# Patient Record
Sex: MALE | Race: WHITE | URBAN - METROPOLITAN AREA
[De-identification: names, ages, dates, MRNs, and addresses within clinical notes are randomized per-mention and may not be internally consistent; named-entity substitution may affect disease eponyms.]

---

## 2017-04-14 ENCOUNTER — OFFICE VISIT (OUTPATIENT)
Dept: FAMILY MEDICINE CLINIC | Age: 62
End: 2017-04-14

## 2017-04-14 VITALS
BODY MASS INDEX: 31.64 KG/M2 | WEIGHT: 221 LBS | TEMPERATURE: 97.6 F | SYSTOLIC BLOOD PRESSURE: 131 MMHG | HEART RATE: 64 BPM | OXYGEN SATURATION: 98 % | RESPIRATION RATE: 16 BRPM | HEIGHT: 70 IN | DIASTOLIC BLOOD PRESSURE: 73 MMHG

## 2017-04-14 DIAGNOSIS — Z00.00 PHYSICAL EXAM, ANNUAL: Primary | ICD-10-CM

## 2017-04-14 DIAGNOSIS — Z11.59 NEED FOR HEPATITIS C SCREENING TEST: ICD-10-CM

## 2017-04-14 DIAGNOSIS — E78.5 HYPERLIPIDEMIA, UNSPECIFIED HYPERLIPIDEMIA TYPE: ICD-10-CM

## 2017-04-14 DIAGNOSIS — Z12.11 SCREENING FOR COLON CANCER: ICD-10-CM

## 2017-04-14 DIAGNOSIS — Z23 NEED FOR VARICELLA VACCINE: ICD-10-CM

## 2017-04-14 NOTE — PATIENT INSTRUCTIONS
Shingles Vaccine: What You Need to Know  What is shingles? Shingles is a painful skin rash, often with blisters. It is also called herpes zoster, or just zoster. A shingles rash usually appears on one side of the face or body and lasts from 2 to 4 weeks. Its main symptom is pain, which can be quite severe. Other symptoms of shingles can include fever, headache, chills and upset stomach. Very rarely, a shingles infection can lead to pneumonia, hearing problems, blindness, brain inflammation (encephalitis) or death. For about 1 person in 5, severe pain can continue even long after the rash clears up. This is called post-herpetic neuralgia. Shingles is caused by the varicella zoster virus, the same virus that causes chickenpox. Only someone who has had chickenpox--or, rarely, has gotten chickenpox vaccine--can get shingles. The virus stays in your body, and can cause shingles many years later. You can't catch shingles from another person with shingles. However, a person who has never had chickenpox (or chickenpox vaccine) could get chickenpox from someone with shingles. This is not very common. Shingles is far more common in people 48years of age and older than in younger people. It is also more common in people whose immune systems are weakened because of a disease such as cancer, or drugs such as steroids or chemotherapy. At least 1 million people a year in the [facility] get shingles. Shingles vaccine  A vaccine for shingles was licensed in 1105. In clinical trials, the vaccine reduced the risk of shingles by 50%. It can also reduce pain in people who still get shingles after being vaccinated. A single dose of shingles vaccine is recommended for adults 61years of age and older.   Some people should not get shingles vaccine or should wait  A person should not get shingles vaccine who:  · Has ever had a life-threatening allergic reaction to gelatin, the antibiotic neomycin, or any other component of shingles vaccine. Tell your doctor if you have any severe allergies. · Has a weakened immune system because of current:  ¨ AIDS or another disease that affects the immune system. ¨ Treatment with drugs that affect the immune system, such as prolonged use of high-dose steroids. ¨ Cancer treatment such as radiation or chemotherapy. ¨ Cancer affecting the bone marrow or lymphatic system, such as leukemia or lymphoma. · Is pregnant, or might be pregnant. Women should not become pregnant until at least 4 weeks after getting shingles vaccine. Someone with a minor acute illness, such as a cold, may be vaccinated. But anyone with a moderate or severe acute illness should usually wait until they recover before getting the vaccine. This includes anyone with a temperature of 101.3° F or higher. What are the risks from shingles vaccine? A vaccine, like any medicine, could possibly cause serious problems, such as severe allergic reactions. However, the risk of a vaccine causing serious harm, or death, is extremely small. No serious problems have been identified with shingles vaccine. Mild problems  · Redness, soreness, swelling, or itching at the site of the injection (about 1 person in 3)  · Headache (about 1 person in 70)  Like all vaccines, shingles vaccine is being closely monitored for unusual or severe problems. What if there is a serious reaction? What should I look for? · Look for anything that concerns you, such as signs of a severe allergic reaction, very high fever, or behavior changes. Signs of a severe allergic reaction can include hives, swelling of the face and throat, difficulty breathing, a fast heartbeat, dizziness, and weakness. These would start a few minutes to a few hours after the vaccination. What should I do? · If you think it is a severe allergic reaction or other emergency that can't wait, call 9-1-1 or get the person to the nearest hospital. Otherwise, call your doctor.   · Afterward, the reaction should be reported to the Vaccine Adverse Event Reporting System (VAERS). Your doctor might file this report, or you can do it yourself through the VAERS web site at www.vaers. hhs.gov, or by calling 3-689.723.9983. VAERS is only for reporting reactions. They do not give medical advice. How can I learn more? · Ask your doctor. · Call your local or state health department. · Contact the Centers for Disease Control and Prevention (CDC):  ¨ Call 8-828.102.9808 (1-800-CDC-INFO) or  ¨ Visit CDC's website at www.cdc.gov/vaccines  Vaccine Information Statement  Shingles Vaccine  (10/6/2009)  Department of Health and Human Services  Centers for Disease Control and Prevention  Many Vaccine Information Statements are available in [place] and other languages. See www.immunize.org/vis. Muchas hojas de información sobre vacunas están disponibles en español y en otros idiomas. Visite www.immunize.org/vis. Care instructions adapted under license by your healthcare professional. If you have questions about a medical condition or this instruction, always ask your healthcare professional. [address] any warranty or liability for your use of this information. Learning About Colonoscopy  What is a colonoscopy? A colonoscopy is a test (also called a procedure) that lets a doctor look inside your large intestine. The doctor uses a thin, lighted tube called a colonoscope. The doctor uses it to look for small growths called polyps, colon or rectal cancer (colorectal cancer), or other problems like bleeding. During the procedure, the doctor can take samples of tissue. The samples can then be checked for cancer or other conditions. The doctor can also take out polyps. How is colonoscopy done? This procedure is done in a doctor's office or a clinic or hospital. You will get medicine to help you relax and not feel pain.  Some people find that they do not remember having the test because of the medicine. The doctor gently moves the colonoscope, or scope, through the colon. The scope is also a small video camera. It lets the doctor see the colon and take pictures. A colonoscopy usually takes 30 to 45 minutes. It may take longer if the doctor has to remove polyps. How do you prepare for the procedure? You need to clean out your colon before the procedure so the doctor can see all of your colon. You may start the cleaning process a day or two before the test. This depends on which \"colon prep\" your doctor recommends. To clean your colon, you stop eating solid foods and drink only clear liquids. You can have water, tea, coffee, clear juices, clear broths, flavored ice pops, and gelatin (such as Jell-O). Do not drink anything red or purple, such as grape juice or fruit punch. And do not eat red or purple foods, such as grape ice pops or cherry gelatin. The day or night before the procedure, you drink a large amount of a special liquid. This causes loose, frequent stools. You will go to the bathroom a lot. It is very important to drink all of the colon prep liquid. If you have problems drinking the liquid, call your doctor. For many people, the prep is worse than the test. It may be uncomfortable, and you may feel hungry on the clear liquid diet. Some people do not go to work or do their usual activities on the day of the prep. Arrange to have someone take you home after the test.  What can you expect after a colonoscopy? The nurses will watch you for 1 to 2 hours until the medicines wear off. Then you can go home. You will need a ride. Your doctor will tell you when you can eat and do your usual activities. Your doctor will talk to you about when you will need your next colonoscopy. The results of your test and your risk for colorectal cancer will help your doctor decide how often you need to be checked. Follow-up care is a key part of your treatment and safety.  Be sure to make and go to all appointments, and call your doctor if you are having problems. It's also a good idea to know your test results and keep a list of the medicines you take. Where can you learn more? Go to http://jonas-yanet.info/. Enter G951 in the search box to learn more about \"Learning About Colonoscopy. \"  Current as of: July 26, 2016  Content Version: 11.2  © 6709-0275 [employer]. Care instructions adapted under license by [employer] (which disclaims liability or warranty for this information). If you have questions about a medical condition or this instruction, always ask your healthcare professional. [address] any warranty or liability for your use of this information.

## 2017-04-14 NOTE — MR AVS SNAPSHOT
Visit Information Date & Time Provider Department Dept. Phone Encounter #  
 4/14/2017  8:20 AM Christa Mehta, [facility] [phone] [id number] Upcoming Health Maintenance Date Due Hepatitis C Screening 1955 FOBT Q 1 YEAR AGE 50-75 7/13/2006 ZOSTER VACCINE AGE 60> 7/13/2016 INFLUENZA AGE 9 TO ADULT 8/1/2016 DTaP/Tdap/Td series (2 - Td) 3/1/2022 Allergies as of 4/14/2017  Review Complete On: 9/16/2015 By: Landry Brand NP No Known Allergies Current Immunizations  Reviewed on 8/13/2015 No immunizations on file. Not reviewed this visit You Were Diagnosed With   
  
 Codes Comments Physical exam, annual    -  Primary ICD-10-CM: Z00.00 ICD-9-CM: V70.0 Hyperlipidemia, unspecified hyperlipidemia type     ICD-10-CM: E78.5 ICD-9-CM: 272.4 Screening for colon cancer     ICD-10-CM: Z12.11 ICD-9-CM: V76.51 Need for varicella vaccine     ICD-10-CM: Z23 ICD-9-CM: V05.4 Vitals BP Pulse Temp Resp Height(growth percentile) Weight(growth percentile) 131/73 64 97.6 °F (36.4 °C) (Oral) 16 5' 10\" (1.778 m) 221 lb (100.2 kg) SpO2 BMI Smoking Status 98% 31.71 kg/m2 Former Smoker BMI and BSA Data Body Mass Index Body Surface Area 31.71 kg/m 2 2.22 m 2 Preferred Pharmacy Pharmacy Name Phone [facility] [place], [address] [place] [facility] [phone] Your Updated Medication List  
  
   
This list is accurate as of: 4/14/17  8:49 AM.  Always use your most recent med list.  
  
  
  
  
 aspirin 81 mg chewable tablet Take 1 Tab by mouth daily. FISH OIL 1,000 mg Cap Generic drug:  omega-3 fatty acids-vitamin e Take 1 Cap by mouth.  
  
 multivitamin, tx-iron-ca-min 9 mg iron-400 mcg Tab tablet Commonly known as:  THERA-M w/ IRON Take 1 Tab by mouth daily. simvastatin 40 mg tablet Commonly known as:  ZOCOR Take 1 Tab by mouth nightly. varicella virus vaccine (live) 1,350 unit/0.5 mL injection Commonly known as:  VARIVAX  
0.5 mL by SubCUTAneous route once for 1 dose. Prescriptions Printed Refills  
 varicella virus vaccine, live, (VARIVAX) 1,350 unit/0.5 mL injection 0 Si.5 mL by SubCUTAneous route once for 1 dose. Class: Print Route: SubCUTAneous We Performed the Following CBC W/O DIFF [52097 CPT(R)] LIPID PANEL [99033 CPT(R)] METABOLIC PANEL, COMPREHENSIVE [36212 CPT(R)] PSA W/ REFLX FREE PSA [70916 CPT(R)] REFERRAL FOR COLONOSCOPY [TCX444 Custom] Comments:  
 Please evaluate patient for screening colonoscopy. Kimberly Olson MD 
[facility], [address] [place] [address] Office: [phone] Daniele Sanchez MD 
[facility] [address] [address] Phone: [phone] Fax: [phone] Referral Information Referral ID Referred By Referred To  
  
 [id number] [facility] Not Available Visits Status Start Date End Date 1 New Request 17 If your referral has a status of pending review or denied, additional information will be sent to support the outcome of this decision. Patient Instructions Shingles Vaccine: What You Need to Know What is shingles? Shingles is a painful skin rash, often with blisters. It is also called herpes zoster, or just zoster. A shingles rash usually appears on one side of the face or body and lasts from 2 to 4 weeks. Its main symptom is pain, which can be quite severe. Other symptoms of shingles can include fever, headache, chills and upset stomach. Very rarely, a shingles infection can lead to pneumonia, hearing problems, blindness, brain inflammation (encephalitis) or death.  
For about 1 person in 5, severe pain can continue even long after the rash clears up. This is called post-herpetic neuralgia. Shingles is caused by the varicella zoster virus, the same virus that causes chickenpox. Only someone who has had chickenpoxor, rarely, has gotten chickenpox vaccinecan get shingles. The virus stays in your body, and can cause shingles many years later. You can't catch shingles from another person with shingles. However, a person who has never had chickenpox (or chickenpox vaccine) could get chickenpox from someone with shingles. This is not very common. Shingles is far more common in people 48years of age and older than in younger people. It is also more common in people whose immune systems are weakened because of a disease such as cancer, or drugs such as steroids or chemotherapy. At least 1 million people a year in the [facility] get shingles. Shingles vaccine A vaccine for shingles was licensed in 6280. In clinical trials, the vaccine reduced the risk of shingles by 50%. It can also reduce pain in people who still get shingles after being vaccinated. A single dose of shingles vaccine is recommended for adults 61years of age and older. Some people should not get shingles vaccine or should wait A person should not get shingles vaccine who: 
· Has ever had a life-threatening allergic reaction to gelatin, the antibiotic neomycin, or any other component of shingles vaccine. Tell your doctor if you have any severe allergies. · Has a weakened immune system because of current: 
¨ AIDS or another disease that affects the immune system. ¨ Treatment with drugs that affect the immune system, such as prolonged use of high-dose steroids. ¨ Cancer treatment such as radiation or chemotherapy. ¨ Cancer affecting the bone marrow or lymphatic system, such as leukemia or lymphoma. · Is pregnant, or might be pregnant. Women should not become pregnant until at least 4 weeks after getting shingles vaccine. Someone with a minor acute illness, such as a cold, may be vaccinated. But anyone with a moderate or severe acute illness should usually wait until they recover before getting the vaccine. This includes anyone with a temperature of 101.3° F or higher. What are the risks from shingles vaccine? A vaccine, like any medicine, could possibly cause serious problems, such as severe allergic reactions. However, the risk of a vaccine causing serious harm, or death, is extremely small. No serious problems have been identified with shingles vaccine. Mild problems · Redness, soreness, swelling, or itching at the site of the injection (about 1 person in 3) · Headache (about 1 person in 79) Like all vaccines, shingles vaccine is being closely monitored for unusual or severe problems. What if there is a serious reaction? What should I look for? · Look for anything that concerns you, such as signs of a severe allergic reaction, very high fever, or behavior changes. Signs of a severe allergic reaction can include hives, swelling of the face and throat, difficulty breathing, a fast heartbeat, dizziness, and weakness. These would start a few minutes to a few hours after the vaccination. What should I do? · If you think it is a severe allergic reaction or other emergency that can't wait, call 9-1-1 or get the person to the nearest hospital. Otherwise, call your doctor. · Afterward, the reaction should be reported to the Vaccine Adverse Event Reporting System (VAERS). Your doctor might file this report, or you can do it yourself through the VAERS web site at www.vaers. hhs.gov, or by calling 3-914.676.9533. VAERS is only for reporting reactions. They do not give medical advice. How can I learn more? · Ask your doctor. · Call your local or state health department. · Contact the Centers for Disease Control and Prevention (CDC): 
¨ Call 8-674.707.4883 (1-800-CDC-INFO) or ¨ Visit CDC's website at www.cdc.gov/vaccines Vaccine Information Statement Shingles Vaccine 
(10/6/2009) Department of Health and [place] Centers for Disease Control and Prevention Many Vaccine Information Statements are available in [place] citizen and other languages. See www.immunize.org/vis. Muchas hojas de información sobre vacunas están disponibles en español y en otros idiomas. Visite www.immunize.org/vis. Care instructions adapted under license by your healthcare professional. If you have questions about a medical condition or this instruction, always ask your healthcare professional. [address] any warranty or liability for your use of this information. Learning About Colonoscopy What is a colonoscopy? A colonoscopy is a test (also called a procedure) that lets a doctor look inside your large intestine. The doctor uses a thin, lighted tube called a colonoscope. The doctor uses it to look for small growths called polyps, colon or rectal cancer (colorectal cancer), or other problems like bleeding. During the procedure, the doctor can take samples of tissue. The samples can then be checked for cancer or other conditions. The doctor can also take out polyps. How is colonoscopy done? This procedure is done in a doctor's office or a clinic or hospital. You will get medicine to help you relax and not feel pain. Some people find that they do not remember having the test because of the medicine. The doctor gently moves the colonoscope, or scope, through the colon. The scope is also a small video camera. It lets the doctor see the colon and take pictures. A colonoscopy usually takes 30 to 45 minutes. It may take longer if the doctor has to remove polyps. How do you prepare for the procedure? You need to clean out your colon before the procedure so the doctor can see all of your colon. You may start the cleaning process a day or two before the test. This depends on which \"colon prep\" your doctor recommends. To clean your colon, you stop eating solid foods and drink only clear liquids. You can have water, tea, coffee, clear juices, clear broths, flavored ice pops, and gelatin (such as Jell-O). Do not drink anything red or purple, such as grape juice or fruit punch. And do not eat red or purple foods, such as grape ice pops or cherry gelatin. The day or night before the procedure, you drink a large amount of a special liquid. This causes loose, frequent stools. You will go to the bathroom a lot. It is very important to drink all of the colon prep liquid. If you have problems drinking the liquid, call your doctor. For many people, the prep is worse than the test. It may be uncomfortable, and you may feel hungry on the clear liquid diet. Some people do not go to work or do their usual activities on the day of the prep. Arrange to have someone take you home after the test. 
What can you expect after a colonoscopy? The nurses will watch you for 1 to 2 hours until the medicines wear off. Then you can go home. You will need a ride. Your doctor will tell you when you can eat and do your usual activities. Your doctor will talk to you about when you will need your next colonoscopy. The results of your test and your risk for colorectal cancer will help your doctor decide how often you need to be checked. Follow-up care is a key part of your treatment and safety. Be sure to make and go to all appointments, and call your doctor if you are having problems. It's also a good idea to know your test results and keep a list of the medicines you take. Where can you learn more? Go to http://jonas-yanet.info/. Enter G533 in the search box to learn more about \"Learning About Colonoscopy. \" Current as of: July 26, 2016 Content Version: 11.2 © 3247-6097 [employer].  Care instructions adapted under license by [employer] (which disclaims liability or warranty for this information). If you have questions about a medical condition or this instruction, always ask your healthcare professional. [address] any warranty or liability for your use of this information. Introducing [facility]! Soha Reyes introduces [employer] patient portal. Now you can access parts of your medical record, email your doctor's office, and request medication refills online. 1. In your internet browser, go to [email or web address] 2. Click on the First Time User? Click Here link in the Sign In box. You will see the New Member Sign Up page. 3. Enter your [employer] Access Code exactly as it appears below. You will not need to use this code after youve completed the sign-up process. If you do not sign up before the expiration date, you must request a new code. · [employer] Access Code: [email or web address] Expires: 7/13/2017  8:49 AM 
 
4. Enter the last four digits of your Social Security Number (xxxx) and Date of Birth (mm/dd/yyyy) as indicated and click Submit. You will be taken to the next sign-up page. 5. Create a [employer] ID. This will be your [employer] login ID and cannot be changed, so think of one that is secure and easy to remember. 6. Create a [employer] password. You can change your password at any time. 7. Enter your Password Reset Question and Answer. This can be used at a later time if you forget your password. 8. Enter your e-mail address. You will receive e-mail notification when new information is available in [address] 9. Click Sign Up. You can now view and download portions of your medical record. 10. Click the Download Summary menu link to download a portable copy of your medical information. If you have questions, please visit the Frequently Asked Questions section of the [employer] website. Remember, [employer] is NOT to be used for urgent needs. For medical emergencies, dial 911. Now available from your iPhone and Android! Please provide this summary of care documentation to your next provider. Your primary care clinician is listed as Jessi Fleming. If you have any questions after today's visit, please call [phone].

## 2017-04-14 NOTE — PROGRESS NOTES
Niki Maldonado is an 62 y.o. male who presents for well male exam.     Doing well. No complaints. Sexually active?: yes with wife    Method of protection: none    Diet: has been eating more healthy, has lost around 40 lbs over the past year, feeling great. Has multiple medical problems. Reports compliance with medications and diet. Med list and most recent labs/studies reviewed with the patient. Trying to be active physically to control weight. Due for repeat labs. Reports no other new c/o. Had Colonoscopy at age 49 (normal), had TDAP 2012, Flu vaccine 09/16    Allergies- reviewed:   No Known Allergies      Medications- reviewed:   Current Outpatient Prescriptions   Medication Sig    varicella virus vaccine, live, (VARIVAX) 1,350 unit/0.5 mL injection 0.5 mL by SubCUTAneous route once for 1 dose.  simvastatin (ZOCOR) 40 mg tablet Take 1 Tab by mouth nightly.  multivitamin, tx-iron-ca-min (THERA-M W/ IRON) 9 mg iron-400 mcg tab tablet Take 1 Tab by mouth daily.  omega-3 fatty acids-vitamin e (FISH OIL) 1,000 mg cap Take 1 Cap by mouth.  aspirin 81 mg chewable tablet Take 1 Tab by mouth daily. No current facility-administered medications for this visit.           Past Medical History- reviewed:  Past Medical History:   Diagnosis Date    Broken wrist     Family history of skin cancer     mother, father, sister    Hypercholesterolemia     Sun-damaged skin     Sunburn, blistering          Past Surgical History- reviewed:   Past Surgical History:   Procedure Laterality Date    HX HERNIA REPAIR      2003    HX TONSILLECTOMY      HX WISDOM TEETH EXTRACTION           Family History - reviewed:  Family History   Problem Relation Age of Onset   [facility] Cancer Mother      Thyroid, Uterine, skin, abd     Cancer Father      skin    Stroke Father      80years old   [facility] Cancer Sister      skin         Social History - reviewed:  Social History     Social History    Marital status: UNKNOWN     Spouse name: N/A    Number of children: N/A    Years of education: N/A     Occupational History    Not on file. Social History Main Topics    Smoking status: Former Smoker     Packs/day: 0.50     Types: Cigarettes     Start date: 2/27/1966     Quit date: 1/1/1986    Smokeless tobacco: Never Used    Alcohol use 1.0 oz/week     2 Cans of beer per week    Drug use: No    Sexual activity: Not on file     Other Topics Concern    Not on file     Social History Narrative         Immunizations - reviewed: There is no immunization history on file for this patient.   Flu: 2016  Tdap: 2012  Pneumovax: no  Zostervax: 61      Health Maintenance reviewed -   Colonoscopy 51, negative  DEXA scan no  PSA testing today  HIV testing no  Hepatitis C testing today  AAA screening na  Lung cancer screening na      ROS obtained from patient, positives are bolded  CONSTITUTIONAL: fever, chills, fatigue, weight change  EYES: blurry vision, decreased vision  ENT: sore throat, nasal congestion, nasal discharge, tinnitus  CARDIOVASCULAR: chest pain, edema, palpitations  RESPIRATORY: cough, shortness of breath, pleuritic chest pain, wheezing  ENDOCRINE: polydipsia/polyuria, skin changes, temperature intolerance  GI: abdominal pain, flank pain, nausea, vomiting, diarrhea, constipation, black stool, blood in stool  : dysuria, frequency/urgency, hematuria  NEURO: dizzy/vertigo, headache, numbness/tingling, confusion, memory loss  MUSCULOSKELETAL: joint pain, joint stiffness, joint swelling, muscle pain, muscle weakness  SKIN: rash, itching, hives  PSYCH: anxiety, depression      Objective:     Visit Vitals    /73    Pulse 64    Temp 97.6 °F (36.4 °C) (Oral)    Resp 16    Ht 5' 10\" (1.778 m)    Wt 221 lb (100.2 kg)    SpO2 98%    BMI 31.71 kg/m2       General appearance - alert, well appearing, and in no distress  Eyes - pupils equal and reactive, extraocular eye movements intact  Ears - bilateral TM's and external ear canals normal  Nose - normal and patent, no erythema, discharge or polyps  Mouth - mucous membranes moist, pharynx normal without lesions  Neck - supple, no significant adenopathy  Chest - clear to auscultation, no wheezes, rales or rhonchi, symmetric air entry  Heart - normal rate, regular rhythm, normal S1, S2, no murmurs, rubs, clicks or gallops  Abdomen - soft, nontender, nondistended, no masses or organomegaly  Neurological - alert, oriented, normal speech, no focal findings or movement disorder noted  Musculoskeletal - no joint tenderness, deformity or swelling  Extremities - peripheral pulses normal, no pedal edema, no clubbing or cyanosis  Skin - normal coloration and turgor, no rashes, no suspicious skin lesions noted    Assessment:       ICD-10-CM ICD-9-CM    1. Physical exam, annual Z00.00 V70.0 LIPID PANEL      METABOLIC PANEL, COMPREHENSIVE      PSA W/ REFLX FREE PSA      CBC W/O DIFF   2. Hyperlipidemia, unspecified hyperlipidemia type E78.5 272.4 LIPID PANEL      METABOLIC PANEL, COMPREHENSIVE   3. Screening for colon cancer Z12.11 V76.51 REFERRAL FOR COLONOSCOPY   4. Need for varicella vaccine Z23 V05.4 varicella virus vaccine, live, (VARIVAX) 1,350 unit/0.5 mL injection   5. Need for hepatitis C screening test Z11.59 V73.89 HEPATITIS C AB         Plan:   · Counseled re: diet, exercise, healthy lifestyle    · Appropriate labs, vaccines, imaging studies, and referrals ordered as listed above    · Discussed the patient's BMI with him. The BMI follow up plan is as follows: I have counseled this patient on diet and exercise regimens. · The patient was counseled on the dangers of tobacco use, and was advised to quit. Reviewed strategies to maximize success, including written materials. Follow-up Disposition:  Return in about 1 year (around 4/14/2018). I have discussed the diagnosis with the patient and the intended plan as seen in the above orders.  Patient verbalized understanding of the plan and agrees with the plan. The patient has received an after-visit summary and questions were answered concerning future plans. I have discussed medication side effects and warnings with the patient as well. Informed patient to return to the office if new symptoms arise.         Federico Medina MD  Family Medicine Resident

## 2017-04-15 LAB
ALBUMIN SERPL-MCNC: 4.4 G/DL (ref 3.6–4.8)
ALBUMIN/GLOB SERPL: 2.3 {RATIO} (ref 1.2–2.2)
ALP SERPL-CCNC: 48 IU/L (ref 39–117)
ALT SERPL-CCNC: 22 IU/L (ref 0–44)
AST SERPL-CCNC: 16 IU/L (ref 0–40)
BILIRUB SERPL-MCNC: 0.4 MG/DL (ref 0–1.2)
BUN SERPL-MCNC: 18 MG/DL (ref 8–27)
BUN/CREAT SERPL: 17 (ref 10–24)
CALCIUM SERPL-MCNC: 9.4 MG/DL (ref 8.6–10.2)
CHLORIDE SERPL-SCNC: 103 MMOL/L (ref 96–106)
CHOLEST SERPL-MCNC: 164 MG/DL (ref 100–199)
CO2 SERPL-SCNC: 21 MMOL/L (ref 18–29)
CREAT SERPL-MCNC: 1.04 MG/DL (ref 0.76–1.27)
ERYTHROCYTE [DISTWIDTH] IN BLOOD BY AUTOMATED COUNT: 13.4 % (ref 12.3–15.4)
GLOBULIN SER CALC-MCNC: 1.9 G/DL (ref 1.5–4.5)
GLUCOSE SERPL-MCNC: 115 MG/DL (ref 65–99)
HCT VFR BLD AUTO: 43.5 % (ref 37.5–51)
HCV AB S/CO SERPL IA: <0.1 S/CO RATIO (ref 0–0.9)
HDLC SERPL-MCNC: 58 MG/DL
HGB BLD-MCNC: 14.5 G/DL (ref 12.6–17.7)
INTERPRETATION, 910389: NORMAL
LDLC SERPL CALC-MCNC: 89 MG/DL (ref 0–99)
MCH RBC QN AUTO: 29.4 PG (ref 26.6–33)
MCHC RBC AUTO-ENTMCNC: 33.3 G/DL (ref 31.5–35.7)
MCV RBC AUTO: 88 FL (ref 79–97)
PLATELET # BLD AUTO: 208 X10E3/UL (ref 150–379)
POTASSIUM SERPL-SCNC: 4.5 MMOL/L (ref 3.5–5.2)
PROT SERPL-MCNC: 6.3 G/DL (ref 6–8.5)
PSA SERPL-MCNC: 0.7 NG/ML (ref 0–4)
RBC # BLD AUTO: 4.94 X10E6/UL (ref 4.14–5.8)
REFLEX CRITERIA: NORMAL
SODIUM SERPL-SCNC: 147 MMOL/L (ref 134–144)
TRIGL SERPL-MCNC: 85 MG/DL (ref 0–149)
VLDLC SERPL CALC-MCNC: 17 MG/DL (ref 5–40)
WBC # BLD AUTO: 5.9 X10E3/UL (ref 3.4–10.8)

## 2017-06-03 NOTE — TELEPHONE ENCOUNTER
Patient needs a refill of the following  Requested Prescriptions     Pending Prescriptions Disp Refills    simvastatin (ZOCOR) 40 mg tablet 90 Tab 3     Sig: Take 1 Tab by mouth nightly.

## 2017-06-05 RX ORDER — SIMVASTATIN 40 MG/1
40 TABLET, FILM COATED ORAL
Qty: 90 TAB | Refills: 3 | Status: SHIPPED | OUTPATIENT
Start: 2017-06-05 | End: 2018-08-27 | Stop reason: SDUPTHER

## 2018-08-06 ENCOUNTER — OFFICE VISIT (OUTPATIENT)
Dept: FAMILY MEDICINE CLINIC | Age: 63
End: 2018-08-06

## 2018-08-06 DIAGNOSIS — R07.9 CHEST PAIN, UNSPECIFIED TYPE: Primary | ICD-10-CM

## 2018-08-06 DIAGNOSIS — Z01.30 BP CHECK: ICD-10-CM

## 2018-08-06 LAB — ELECTROCARDIOGRAM,EKG: NORMAL

## 2018-08-06 NOTE — MR AVS SNAPSHOT
[address] 
[phone] Patient: Cortes Salazar IV MRN: [id number] [facility]:0/15/9397 Visit Information Date & Time Provider Department Dept. Phone Encounter #  
 8/6/2018  2:30 PM Cindy Joaquin MD [facility] [phone] [id number] Upcoming Health Maintenance Date Due FOBT Q 1 YEAR AGE 50-75 7/13/2006 ZOSTER VACCINE AGE 60> 5/13/2016 Influenza Age 5 to Adult 8/1/2018 DTaP/Tdap/Td series (2 - Td) 3/1/2022 Allergies as of 8/6/2018  Review Complete On: 4/14/2017 By: Janet Cedillo No Known Allergies Current Immunizations  Reviewed on 8/13/2015 No immunizations on file. Not reviewed this visit You Were Diagnosed With   
  
 Codes Comments Chest pain, unspecified type    -  Primary ICD-10-CM: R07.9 ICD-9-CM: 786.50 Vitals BP Pulse Temp Resp Height(growth percentile) Weight(growth percentile) 128/71 65 98.2 °F (36.8 °C) 16 5' 10\" (1.778 m) 226 lb (102.5 kg) SpO2 BMI Smoking Status 100% 32.43 kg/m2 Former Smoker Vitals History BMI and BSA Data Body Mass Index Body Surface Area  
 32.43 kg/m 2 2.25 m 2 Preferred Pharmacy Pharmacy Name Phone [facility] [place], [address] [place] [address] [facility] [phone] Your Updated Medication List  
  
   
This list is accurate as of 8/6/18  3:34 PM.  Always use your most recent med list.  
  
  
  
  
 aspirin 81 mg chewable tablet Take 1 Tab by mouth daily. FISH OIL 1,000 mg Cap Generic drug:  omega-3 fatty acids-vitamin e Take 1 Cap by mouth.  
  
 multivitamin, tx-iron-ca-min 9 mg iron-400 mcg Tab tablet Commonly known as:  THERA-M w/ IRON Take 1 Tab by mouth daily. simvastatin 40 mg tablet Commonly known as:  ZOCOR Take 1 Tab by mouth nightly. We Performed the Following CBC W/O DIFF [36622 CPT(R)] EKG, 12 LEAD, INITIAL [06195 CPT(R)] LIPID PANEL [32171 CPT(R)] METABOLIC PANEL, BASIC [11957 CPT(R)] REFERRAL TO CARDIOLOGY [YGG60 Custom] Comments:  
 Referral for stress test. Patient found to have Chest pain with abnormal EKG. Referral Information Referral ID Referred By [facility] To  
  
 [id number] [address] [place], [address] [facility] Phone: [phone] Fax: [phone] Visits Status Start Date End Date 1 New Request 8/6/18 8/6/19 If your referral has a status of pending review or denied, additional information will be sent to support the outcome of this decision. Introducing [facility]! Ruth Marcano introduces [employer] patient portal. Now you can access parts of your medical record, email your doctor's office, and request medication refills online. 1. In your internet browser, go to [email or web address] 2. Click on the First Time User? Click Here link in the Sign In box. You will see the New Member Sign Up page. 3. Enter your [employer] Mobile Access Code exactly as it appears below. You will not need to use this code after youve completed the sign-up process. If you do not sign up before the expiration date, you must request a new code. · [employer] Access Code: [email or web address] Expires: 11/4/2018  3:32 PM 
 
4. Enter the last four digits of your Social Security Number (xxxx) and Date of Birth (mm/dd/yyyy) as indicated and click Submit. You will be taken to the next sign-up page. 5. Create a Spondot ID. This will be your [employer] Mobile login ID and cannot be changed, so think of one that is secure and easy to remember. 6. Create a Spondot password. You can change your password at any time. 7. Enter your Password Reset Question and Answer. This can be used at a later time if you forget your password. 8. Enter your e-mail address. You will receive e-mail notification when new information is available in [address] 9. Click Sign Up. You can now view and download portions of your medical record. 10. Click the Download Summary menu link to download a portable copy of your medical information. If you have questions, please visit the Frequently Asked Questions section of the [employer] website. Remember, [employer] is NOT to be used for urgent needs. For medical emergencies, dial 911. Now available from your iPhone and Android! Please provide this summary of care documentation to your next provider. Your primary care clinician is listed as Misty Muniz. If you have any questions after today's visit, please call [phone].

## 2018-08-06 NOTE — PROGRESS NOTES
CC: Elevated BP, Chest pain      HPI:    Patient reports that on Friday he was at his dentist office and BP using a wrist BP cuff was 154/57. He went to [facility] today to have it checked again and it was 164/101. Patient denies being stressed at the time. Never had high BP before. In the past, his BPs usually range 773J-949U systolic. Endorses CP  - described as a dull, aching, substernal   -started 2-3 wks ago   -non-exertional  -intermittently occurs, felt while sitting , not during exertion  -Denies any SOB, denies any palpitations   -Endorses some leg swelling in ankles  -endorses some lightheadedness occasionally, often when standing up too quickly    FmHx:   Father had stroke at 81yo, no known family hx of MI    Meds: takes aspirin, simvastatin, and multivitamin. Social Hx:   -Drinks 1-2 beers on weekends  -Doesn't eat much red meat     STOP Bang score = 4     Review of Systems:    Constitutional: negative for Fever,chills  CV: positive for CP, negative for palpitations  Resp: negative for SOB, Cough, or Wheezing  GI: denies abdominal pain, n/v/d/c       Current Outpatient Prescriptions:     simvastatin (ZOCOR) 40 mg tablet, Take 1 Tab by mouth nightly., Disp: 90 Tab, Rfl: 3    multivitamin, tx-iron-ca-min (THERA-M W/ IRON) 9 mg iron-400 mcg tab tablet, Take 1 Tab by mouth daily. , Disp: , Rfl:     omega-3 fatty acids-vitamin e (FISH OIL) 1,000 mg cap, Take 1 Cap by mouth., Disp: , Rfl:     aspirin 81 mg chewable tablet, Take 1 Tab by mouth daily. , Disp: [address] Tab, Rfl: 4    No Known Allergies      Past Medical History:   Diagnosis Date    Broken wrist     Family history of skin cancer     mother, father, sister    Hypercholesterolemia     Sun-damaged skin     Sunburn, blistering           Social History     Social History    Marital status: UNKNOWN     Spouse name: N/A    Number of children: N/A    Years of education: N/A     Occupational History    Not on file.      Social History Main Topics    Smoking status: Former Smoker     Packs/day: 0.50     Types: Cigarettes     Start date: 2/27/1966     Quit date: 1/1/1986    Smokeless tobacco: Never Used    Alcohol use 1.0 oz/week     2 Cans of beer per week    Drug use: No    Sexual activity: Not on file     Other Topics Concern    Not on file     Social History Narrative          Family History   Problem Relation Age of Onset    Cancer Mother      Thyroid, Uterine, skin, abd     Cancer Father      skin    Stroke Father      80years old   [facility] Cancer Sister      skin         Physical Exam:     Visit Vitals    /71    Pulse 65    Temp 98.2 °F (36.8 °C)    Resp 16    Ht 5' 10\" (1.778 m)    Wt 226 lb (102.5 kg)    SpO2 100%    BMI 32.43 kg/m2       General appearance: alert, oriented, NAD   HEENT: PERRLA, moist mucus membranes, no LAD  CV: Bradycardic, possible PVCs heard, S1/S2 heard, no m/r/g, chest wall non-tender to palpation  Resp: CTAB, no w/r/r  Abdominal: soft, non-tender to palpation, +BS  Extremities: mild non-pitting BL ankle edema   Skin: no visible rashes    EKG: Sinus Bradycardia with occasional PVCs, no previous EKG available for comparison     Assessment/Plan:   1. Chest pain, unspecified type: 64yo M with hx HLD presents with elevated outpatient BPs and 2-3wks of substernal chest pain. EKG performed showed sinus bradycardia with occasional PVCs. - Referral to cardiologist Dr. Mike Mayen for stress test   - LIPID PANEL  - METABOLIC PANEL, BASIC  - CBC W/O DIFF    2. Elevated BP: Patient with elevated BPs in 653Z and 434C systolic outpatient.  Today, BPs initially elevated in 150s and improved to 743J systolic on recheck.   -Patient advised to obtain blood pressure monitor and check BPs daily  -RTC 1-2wks to follow up on elevated BP      Patient discussed with Dr. Blaine Li MD  Family Medicine Resident

## 2018-08-06 NOTE — PROGRESS NOTES
Chief Complaint   Patient presents with    Blood Pressure Check     Pt did an self blood pressure check this morning and was concerned due to high levels

## 2018-08-07 VITALS
OXYGEN SATURATION: 100 % | HEIGHT: 70 IN | BODY MASS INDEX: 32.35 KG/M2 | WEIGHT: 226 LBS | SYSTOLIC BLOOD PRESSURE: 126 MMHG | RESPIRATION RATE: 16 BRPM | DIASTOLIC BLOOD PRESSURE: 71 MMHG | TEMPERATURE: 98.2 F | HEART RATE: 65 BPM

## 2018-08-07 LAB
BUN SERPL-MCNC: 13 MG/DL (ref 8–27)
BUN/CREAT SERPL: 14 (ref 10–24)
CALCIUM SERPL-MCNC: 9.8 MG/DL (ref 8.6–10.2)
CHLORIDE SERPL-SCNC: 101 MMOL/L (ref 96–106)
CHOLEST SERPL-MCNC: 183 MG/DL (ref 100–199)
CO2 SERPL-SCNC: 25 MMOL/L (ref 20–29)
CREAT SERPL-MCNC: 0.96 MG/DL (ref 0.76–1.27)
ERYTHROCYTE [DISTWIDTH] IN BLOOD BY AUTOMATED COUNT: 13.3 % (ref 12.3–15.4)
GLUCOSE SERPL-MCNC: 90 MG/DL (ref 65–99)
HCT VFR BLD AUTO: 42.7 % (ref 37.5–51)
HDLC SERPL-MCNC: 65 MG/DL
HGB BLD-MCNC: 14.2 G/DL (ref 13–17.7)
INTERPRETATION, 910389: NORMAL
LDLC SERPL CALC-MCNC: 92 MG/DL (ref 0–99)
MCH RBC QN AUTO: 29.9 PG (ref 26.6–33)
MCHC RBC AUTO-ENTMCNC: 33.3 G/DL (ref 31.5–35.7)
MCV RBC AUTO: 90 FL (ref 79–97)
PLATELET # BLD AUTO: 212 X10E3/UL (ref 150–379)
POTASSIUM SERPL-SCNC: 4.8 MMOL/L (ref 3.5–5.2)
RBC # BLD AUTO: 4.75 X10E6/UL (ref 4.14–5.8)
SODIUM SERPL-SCNC: 141 MMOL/L (ref 134–144)
TRIGL SERPL-MCNC: 129 MG/DL (ref 0–149)
VLDLC SERPL CALC-MCNC: 26 MG/DL (ref 5–40)
WBC # BLD AUTO: 7.9 X10E3/UL (ref 3.4–10.8)

## 2018-08-07 NOTE — PROGRESS NOTES
Cholesterol well controlled on simvastatin with total cholesterol 183, , LDL 92. Will continue current statin. BMP WNL with Cr 0.96. EKG sinus bradycardia with occasional PVCs, with associated dizziness.  Patient referred to cardiologist.

## 2018-08-11 ENCOUNTER — TELEPHONE (OUTPATIENT)
Dept: FAMILY MEDICINE CLINIC | Age: 63
End: 2018-08-11

## 2018-08-11 NOTE — TELEPHONE ENCOUNTER
Attempted to call patient with regards to lab results. No answer. Left voice message.        Jael Gutierrez MD   Family Medicine Resident

## 2018-08-24 ENCOUNTER — TELEPHONE (OUTPATIENT)
Dept: FAMILY MEDICINE CLINIC | Age: 63
End: 2018-08-24

## 2018-08-25 NOTE — TELEPHONE ENCOUNTER
Attempted to call patient with regards to lab results. No answer. Left voice message.        Nina Polk MD   Family Medicine Resident

## 2018-08-27 ENCOUNTER — TELEPHONE (OUTPATIENT)
Dept: FAMILY MEDICINE CLINIC | Age: 63
End: 2018-08-27

## 2018-08-27 RX ORDER — SIMVASTATIN 40 MG/1
40 TABLET, FILM COATED ORAL
Qty: 90 TAB | Refills: 3 | Status: SHIPPED | OUTPATIENT
Start: 2018-08-27 | End: 2019-09-18 | Stop reason: SDUPTHER

## 2018-08-27 NOTE — TELEPHONE ENCOUNTER
Spoke with patient over the phone about his labwork from his recent clinic visit showing normal lipid panel, BMP, and CBC. Will continue patient's current statin, simvastatin 40mg daily. Prescription sent to patient's preferred pharmacy.      Hugh To MD  Family Medicine Resident

## 2018-10-04 ENCOUNTER — OFFICE VISIT (OUTPATIENT)
Dept: CARDIOLOGY CLINIC | Age: 63
End: 2018-10-04

## 2018-10-04 VITALS
WEIGHT: 226 LBS | BODY MASS INDEX: 32.35 KG/M2 | SYSTOLIC BLOOD PRESSURE: 128 MMHG | HEART RATE: 64 BPM | DIASTOLIC BLOOD PRESSURE: 76 MMHG | HEIGHT: 70 IN

## 2018-10-04 DIAGNOSIS — E78.5 HYPERLIPIDEMIA, UNSPECIFIED HYPERLIPIDEMIA TYPE: ICD-10-CM

## 2018-10-04 DIAGNOSIS — R07.9 CHEST PAIN, UNSPECIFIED TYPE: Primary | ICD-10-CM

## 2018-10-04 DIAGNOSIS — R94.31 ABNORMAL EKG: ICD-10-CM

## 2018-10-04 NOTE — MR AVS SNAPSHOT
[address] [facility] 
[phone] Patient: Patrick Longoria IV MRN: [id number] [facility]W:[birth year]93 Visit Information Date & Time Provider Department Dept. Phone Encounter #  
 10/4/2018  9:00 AM Roel Moreira MD [facility] [place] [phone] [id number] Your Appointments 11/9/2018  9:00 AM  
STRESS ECHOCARDIOGRAMS with ROCÍO BURNS  
[facility] ([facility] SCHEDULING) Appt Note: stress echo per dr Lidia Gaston [facility] [address] [facility]  
[address] [address] [facility] [address] Upcoming Health Maintenance Date Due Shingrix Vaccine Age 50> (1 of 2) 7/13/2006 FOBT Q 1 YEAR AGE 50-75 7/13/2006 Influenza Age 5 to Adult 8/1/2018 DTaP/Tdap/Td series (2 - Td) 3/1/2022 Allergies as of 10/4/2018  Review Complete On: 10/4/2018 By: Roel Moreira MD  
 No Known Allergies Current Immunizations  Reviewed on 8/13/2015 No immunizations on file. Not reviewed this visit You Were Diagnosed With   
  
 Codes Comments Chest pain, unspecified type    -  Primary ICD-10-CM: R07.9 ICD-9-CM: 786.50 Hyperlipidemia, unspecified hyperlipidemia type     ICD-10-CM: E78.5 ICD-9-CM: 272.4 Abnormal EKG     ICD-10-CM: R94.31 
ICD-9-CM: 794.31 Vitals BP Pulse Height(growth percentile) Weight(growth percentile) BMI Smoking Status 128/76 64 5' 10\" (1.778 m) 226 lb (102.5 kg) 32.43 kg/m2 Former Smoker Vitals History BMI and BSA Data Body Mass Index Body Surface Area  
 32.43 kg/m 2 2.25 m 2 Preferred Pharmacy Pharmacy Name Phone [facility] [place], [address] [place] AT [facility] [phone] Your Updated Medication List  
  
   
 This list is accurate as of 10/4/18  9:20 AM.  Always use your most recent med list.  
  
  
  
  
 aspirin 81 mg chewable tablet Take 1 Tab by mouth daily. FISH OIL 1,000 mg Cap Generic drug:  omega-3 fatty acids-vitamin e Take 1 Cap by mouth.  
  
 multivitamin, tx-iron-ca-min 9 mg iron-400 mcg Tab tablet Commonly known as:  THERA-M w/ IRON Take 1 Tab by mouth daily. simvastatin 40 mg tablet Commonly known as:  ZOCOR Take 1 Tab by mouth nightly. Introducing [facility]! [facility] introduces "[employer] patient portal. Now you can access parts of your medical record, email your doctor's office, and request medication refills online. 1. In your internet browser, go to [email or web address] 2. Click on the First Time User? Click Here link in the Sign In box. You will see the New Member Sign Up page. 3. Enter your "[employer] Access Code exactly as it appears below. You will not need to use this code after youve completed the sign-up process. If you do not sign up before the expiration date, you must request a new code. · "[employer] Access Code: [email or web address] Expires: 11/4/2018  3:32 PM 
 
4. Enter the last four digits of your Social Security Number (xxxx) and Date of Birth (mm/dd/yyyy) as indicated and click Submit. You will be taken to the next sign-up page. 5. Create a "[employer] ID. This will be your "[employer] login ID and cannot be changed, so think of one that is secure and easy to remember. 6. Create a "[employer] password. You can change your password at any time. 7. Enter your Password Reset Question and Answer. This can be used at a later time if you forget your password. 8. Enter your e-mail address. You will receive e-mail notification when new information is available in [address] 9. Click Sign Up. You can now view and download portions of your medical record.  
10. Click the Download Summary menu link to download a portable copy of your medical information. If you have questions, please visit the Frequently Asked Questions section of the [employer] website. Remember, [employer] is NOT to be used for urgent needs. For medical emergencies, dial 911. Now available from your iPhone and Android! Please provide this summary of care documentation to your next provider. Your primary care clinician is listed as Lainey Holland. If you have any questions after today's visit, please call [phone].

## 2018-10-04 NOTE — PROGRESS NOTES
Mt Mayers MD. [facility] [place]              Patient: Balbir Wyatt IV  : 1955      Today's Date: 10/4/2018            HISTORY OF PRESENT ILLNESS:     History of Present Illness:  Reason for consult: Chest pain with abnormal EKG. He saw Dr. Edsel Alpers 18. His note stated \"Patient reports that on Friday he was at his dentist office and BP using a wrist BP cuff was 154/57. He went to [facility] today to have it checked again and it was 164/101. Patient denies being stressed at the time. Never had high BP before. In the past, his BPs usually range 869Y-181A systolic. \"    At the same time in August he was having some chest muscle tightness (random, mild). Since then he has been pain free. Has no complaints. No SOB. No palpitations. BP now 120-126/70-75. PAST MEDICAL HISTORY:     Past Medical History:   Diagnosis Date    Broken wrist     Family history of skin cancer     mother, father, sister    Hypercholesterolemia     Sun-damaged skin     Sunburn, blistering          Past Surgical History:   Procedure Laterality Date    HX HERNIA REPAIR          HX TONSILLECTOMY      HX WISDOM TEETH EXTRACTION             MEDICATIONS:     Current Outpatient Prescriptions   Medication Sig Dispense Refill    simvastatin (ZOCOR) 40 mg tablet Take 1 Tab by mouth nightly. 90 Tab 3    multivitamin, tx-iron-ca-min (THERA-M W/ IRON) 9 mg iron-400 mcg tab tablet Take 1 Tab by mouth daily.  omega-3 fatty acids-vitamin e (FISH OIL) 1,000 mg cap Take 1 Cap by mouth.  aspirin 81 mg chewable tablet Take 1 Tab by mouth daily.  90 Tab 4       No Known Allergies          SOCIAL HISTORY:     Social History   Substance Use Topics    Smoking status: Former Smoker     Packs/day: 0.50     Types: Cigarettes     Start date: 1966     Quit date: 1986    Smokeless tobacco: Never Used    Alcohol use 1.0 oz/week     2 Cans of beer per week           FAMILY HISTORY:     Family History   Problem Relation Age of Onset   Radhika King Cancer Mother      Thyroid, Uterine, skin, abd     Cancer Father      skin    Stroke Father      80years old   Radhika King Cancer Sister      skin           REVIEW OF SYMPTOMS:     Review of Symptoms:  Constitutional: Negative for fever, chills  HEENT: Negative for nosebleeds, tinnitus, and vision changes. Respiratory: Negative for cough, wheezing  Cardiovascular: Negative for orthopnea, claudication, syncope, and PND. Gastrointestinal: Negative for abdominal pain, diarrhea, melena. Genitourinary: Negative for dysuria  Musculoskeletal: Negative for myalgias. Skin: Negative for rash  Heme: No problems bleeding. Neurological: Negative for speech change and focal weakness. PHYSICAL EXAM:     Physical Exam:  Visit Vitals    /76    Pulse 64    Ht 5' 10\" (1.778 m)    Wt 226 lb (102.5 kg)    BMI 32.43 kg/m2     Patient appears generally well, mood and affect are appropriate and pleasant. HEENT:  Hearing intact, non-icteric, normocephalic, atraumatic. Neck Exam: Supple, No JVD or carotid bruits. Lung Exam: Clear to auscultation, even breath sounds. Cardiac Exam: Regular rate and rhythm with no murmur  Abdomen: Soft, non-tender, normal bowel sounds. No bruits or masses. Extremities: Moves all ext well. No lower extremity edema. Vascular: 2+ dorsalis pedis pulses bilaterally.   Psych: Appropriate affect  Neuro - Grossly intact        LABS / OTHER STUDIES:       Lab Results   Component Value Date/Time    Sodium 141 08/06/2018 03:57 PM    Potassium 4.8 08/06/2018 03:57 PM    Chloride 101 08/06/2018 03:57 PM    CO2 25 08/06/2018 03:57 PM    Glucose 90 08/06/2018 03:57 PM    BUN 13 08/06/2018 03:57 PM    Creatinine 0.96 08/06/2018 03:57 PM    BUN/Creatinine ratio 14 08/06/2018 03:57 PM    GFR est AA 98 08/06/2018 03:57 PM    GFR est non-AA 84 08/06/2018 03:57 PM    Calcium 9.8 08/06/2018 03:57 PM    Bilirubin, total 0.4 04/14/2017 09:01 AM    AST (SGOT) 16 04/14/2017 09:01 AM    Alk. phosphatase 48 04/14/2017 09:01 AM    Protein, total 6.3 04/14/2017 09:01 AM    Albumin 4.4 04/14/2017 09:01 AM    A-G Ratio 2.3 (H) 04/14/2017 09:01 AM    ALT (SGPT) 22 04/14/2017 09:01 AM       Lab Results   Component Value Date/Time    Cholesterol, total 183 08/06/2018 03:57 PM    HDL Cholesterol 65 08/06/2018 03:57 PM    LDL, calculated 92 08/06/2018 03:57 PM    VLDL, calculated 26 08/06/2018 03:57 PM    Triglyceride 129 08/06/2018 03:57 PM     Lab Results   Component Value Date/Time    WBC 7.9 08/06/2018 03:57 PM    HGB 14.2 08/06/2018 03:57 PM    HCT 42.7 08/06/2018 03:57 PM    PLATELET 619 32/68/8264 03:57 PM    MCV 90 08/06/2018 03:57 PM             CARDIAC DIAGNOSTICS:     Cardiac Evaluation Includes:    EKG 8/6/18 - sinus [place], PVC, PRWP    I viewed EKG myself         ASSESSMENT AND PLAN:     Assessment and Plan:  1) Non-cardiac chest pain, PVC, PRWP  - He is doing well on 10/4/18 without any complaints   - Will check a stress echo to rule out underlying heart disease     2) CV risk management   - he is working on diet and exercise and weight loss  - cont statin and ASA    3) Phone FU after testing. See me back PRN. Patient expressed understanding of the plan - questions were answered. 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 Baldemar Trejo MD, [address], [address]  [place], [address] [place] [address]  Ph: [phone]   Ph [phone]        ADDENDUM   11/10/2018  Stress Echo 11/9/18 - walked 6 min (7 METS). No CP. Normal stress EKG. Normal stress echo. Patient given normal results after the study.

## 2018-11-09 ENCOUNTER — CLINICAL SUPPORT (OUTPATIENT)
Dept: CARDIOLOGY CLINIC | Age: 63
End: 2018-11-09

## 2018-11-09 DIAGNOSIS — R07.9 CHEST PAIN, UNSPECIFIED TYPE: Primary | ICD-10-CM

## 2018-11-09 DIAGNOSIS — E78.5 HYPERLIPIDEMIA, UNSPECIFIED HYPERLIPIDEMIA TYPE: ICD-10-CM

## 2018-11-09 NOTE — PROGRESS NOTES
Per Dr. Ruelas Covert, utilize Rebeka Dugan 96. ID verified per protocol. Test and risks explained to patient. Consent signed after all questions answered. Started saline lock #22 gauge in Right Cephalic. 1 stick. Good blood return and flushed without difficulty. At 9:10 am, activated Definity (1.3 mL in 8.7 ml NS) injected  X 2. (Total 4.2 mLs given). No complaint of voiced. Echo images obtained at rest and immediately post exercise. Removed saline lock and applied pressure until homeostasis achieved. Dressing applied. Patient instructed to leave dressing on times 1 hr. Verbalized understanding. Patient waited in stress echo room until test completed. Patient left office without complaints of voiced.

## 2019-09-18 ENCOUNTER — TELEPHONE (OUTPATIENT)
Dept: FAMILY MEDICINE CLINIC | Age: 64
End: 2019-09-18

## 2019-09-18 DIAGNOSIS — E78.5 HYPERLIPIDEMIA, UNSPECIFIED HYPERLIPIDEMIA TYPE: Primary | ICD-10-CM

## 2019-09-18 RX ORDER — SIMVASTATIN 40 MG/1
40 TABLET, FILM COATED ORAL
Qty: 90 TAB | Refills: 0 | Status: SHIPPED | OUTPATIENT
Start: 2019-09-18

## 2019-09-18 NOTE — TELEPHONE ENCOUNTER
Patient called stating he has moved and he needs his script for simvastatin forwarded to [address], [place] [address]. Ph [phone].

## 2019-09-18 NOTE — TELEPHONE ENCOUNTER
Script for simvastatin sent to patient's preferred pharmacy.        Ivana Curtis MD  Family Medicine Resident